# Patient Record
Sex: FEMALE | Race: WHITE | Employment: FULL TIME | URBAN - METROPOLITAN AREA
[De-identification: names, ages, dates, MRNs, and addresses within clinical notes are randomized per-mention and may not be internally consistent; named-entity substitution may affect disease eponyms.]

---

## 2017-01-20 ENCOUNTER — TELEPHONE (OUTPATIENT)
Dept: FAMILY MEDICINE CLINIC | Age: 47
End: 2017-01-20

## 2017-02-10 RX ORDER — LEVOTHYROXINE SODIUM 112 MCG
112 TABLET ORAL DAILY
Qty: 30 TABLET | Refills: 11 | Status: SHIPPED | OUTPATIENT
Start: 2017-02-10

## 2017-03-06 ENCOUNTER — TELEPHONE (OUTPATIENT)
Dept: FAMILY MEDICINE CLINIC | Age: 47
End: 2017-03-06

## 2017-03-20 ENCOUNTER — TELEPHONE (OUTPATIENT)
Dept: FAMILY MEDICINE CLINIC | Age: 47
End: 2017-03-20

## 2017-09-20 ENCOUNTER — HOSPITAL ENCOUNTER (EMERGENCY)
Age: 47
Discharge: HOME | End: 2017-09-20
Payer: COMMERCIAL

## 2017-09-20 VITALS
SYSTOLIC BLOOD PRESSURE: 156 MMHG | HEIGHT: 68 IN | BODY MASS INDEX: 27.28 KG/M2 | HEART RATE: 107 BPM | OXYGEN SATURATION: 99 % | WEIGHT: 180 LBS | TEMPERATURE: 98.4 F | DIASTOLIC BLOOD PRESSURE: 85 MMHG | RESPIRATION RATE: 16 BRPM

## 2017-09-20 DIAGNOSIS — J06.9 ACUTE UPPER RESPIRATORY INFECTION: Primary | ICD-10-CM

## 2017-09-20 PROCEDURE — 99213 OFFICE O/P EST LOW 20 MIN: CPT | Performed by: NURSE PRACTITIONER

## 2017-09-20 PROCEDURE — 99212 OFFICE O/P EST SF 10 MIN: CPT

## 2017-09-20 RX ORDER — PREDNISONE 20 MG/1
20 TABLET ORAL 2 TIMES DAILY
Qty: 10 TABLET | Refills: 0 | Status: SHIPPED | OUTPATIENT
Start: 2017-09-20 | End: 2017-09-25

## 2017-09-20 RX ORDER — AMOXICILLIN AND CLAVULANATE POTASSIUM 875; 125 MG/1; MG/1
1 TABLET, FILM COATED ORAL 2 TIMES DAILY
Qty: 20 TABLET | Refills: 0 | Status: SHIPPED | OUTPATIENT
Start: 2017-09-20 | End: 2017-09-30

## 2017-09-20 RX ORDER — ALBUTEROL SULFATE 90 UG/1
2 AEROSOL, METERED RESPIRATORY (INHALATION) EVERY 4 HOURS PRN
Qty: 1 INHALER | Refills: 0 | Status: SHIPPED | OUTPATIENT
Start: 2017-09-20

## 2017-09-20 RX ORDER — BENZONATATE 100 MG/1
100 CAPSULE ORAL 3 TIMES DAILY PRN
Qty: 21 CAPSULE | Refills: 0 | Status: SHIPPED | OUTPATIENT
Start: 2017-09-20 | End: 2017-09-27

## 2017-09-20 ASSESSMENT — ENCOUNTER SYMPTOMS
TROUBLE SWALLOWING: 0
COUGH: 1
SINUS PRESSURE: 1
RHINORRHEA: 1
NAUSEA: 0
VOMITING: 0
SORE THROAT: 0
CHEST TIGHTNESS: 0
DIARRHEA: 0
ABDOMINAL PAIN: 0
SHORTNESS OF BREATH: 0

## 2017-10-08 DIAGNOSIS — I10 ESSENTIAL HYPERTENSION: ICD-10-CM

## 2017-10-09 RX ORDER — OLMESARTAN MEDOXOMIL AND HYDROCHLOROTHIAZIDE 40/25 40; 25 MG/1; MG/1
TABLET ORAL
Qty: 30 TABLET | Refills: 11 | OUTPATIENT
Start: 2017-10-09

## 2017-11-04 DIAGNOSIS — E78.5 HYPERLIPIDEMIA LDL GOAL <70: ICD-10-CM

## 2017-11-06 RX ORDER — ATORVASTATIN CALCIUM 20 MG/1
TABLET, FILM COATED ORAL
Qty: 30 TABLET | Refills: 11 | Status: SHIPPED | OUTPATIENT
Start: 2017-11-06

## 2018-01-17 ENCOUNTER — PATIENT MESSAGE (OUTPATIENT)
Dept: FAMILY MEDICINE CLINIC | Age: 48
End: 2018-01-17

## 2018-01-17 DIAGNOSIS — I10 ESSENTIAL HYPERTENSION: ICD-10-CM

## 2018-01-17 RX ORDER — OLMESARTAN MEDOXOMIL AND HYDROCHLOROTHIAZIDE 40/25 40; 25 MG/1; MG/1
TABLET ORAL
Qty: 30 TABLET | Refills: 0 | Status: SHIPPED | OUTPATIENT
Start: 2018-01-17 | End: 2018-02-21 | Stop reason: SDUPTHER

## 2018-01-17 NOTE — TELEPHONE ENCOUNTER
From: Sylvie Moncada Masters  Sent: 1/16/2018 11:30 PM EST  Subject: Medication Renewal Request    Sylvie Moncada. Masters would like a refill of the following medications:  dapagliflozin (FARXIGA) 10 MG tablet Gita Carr, DO]    Preferred pharmacy: [id number] - [address] [place] [address] [phone] - F [phone]    Comment:  Is there a way u can approve me for jardiancw until I come to my appointment, as I am out of town and was going to have my family member  my prescription and send to me.  Let me know, thanks

## 2018-02-05 ENCOUNTER — TELEPHONE (OUTPATIENT)
Dept: FAMILY MEDICINE CLINIC | Age: 48
End: 2018-02-05

## 2018-02-21 DIAGNOSIS — I10 ESSENTIAL HYPERTENSION: ICD-10-CM

## 2018-02-21 RX ORDER — OLMESARTAN MEDOXOMIL AND HYDROCHLOROTHIAZIDE 40/25 40; 25 MG/1; MG/1
TABLET ORAL
Qty: 30 TABLET | Refills: 0 | Status: SHIPPED | OUTPATIENT
Start: 2018-02-21

## 2020-09-04 ENCOUNTER — HOSPITAL ENCOUNTER (EMERGENCY)
Age: 50
Discharge: HOME | End: 2020-09-04
Payer: COMMERCIAL

## 2020-09-04 VITALS
WEIGHT: 185 LBS | OXYGEN SATURATION: 98 % | RESPIRATION RATE: 16 BRPM | DIASTOLIC BLOOD PRESSURE: 77 MMHG | TEMPERATURE: 97.6 F | SYSTOLIC BLOOD PRESSURE: 129 MMHG | HEIGHT: 67 IN | BODY MASS INDEX: 29.03 KG/M2 | HEART RATE: 84 BPM

## 2020-09-04 PROCEDURE — 99212 OFFICE O/P EST SF 10 MIN: CPT

## 2020-09-04 PROCEDURE — 99213 OFFICE O/P EST LOW 20 MIN: CPT | Performed by: NURSE PRACTITIONER

## 2020-09-04 RX ORDER — PREDNISONE 20 MG/1
20 TABLET ORAL 2 TIMES DAILY
Qty: 10 TABLET | Refills: 0 | Status: SHIPPED | OUTPATIENT
Start: 2020-09-04 | End: 2020-09-09

## 2020-09-04 RX ORDER — LORATADINE 10 MG/1
10 TABLET ORAL DAILY
Qty: 30 TABLET | Refills: 0 | Status: SHIPPED | OUTPATIENT
Start: 2020-09-04 | End: 2020-10-04

## 2020-09-04 ASSESSMENT — ENCOUNTER SYMPTOMS
COUGH: 0
SHORTNESS OF BREATH: 0
FACIAL SWELLING: 0
TROUBLE SWALLOWING: 0

## 2020-09-04 NOTE — ED PROVIDER NOTES
[facility]  Urgent Care Encounter       CHIEF COMPLAINT       Chief Complaint   Patient presents with    Rash       Nurses Notes reviewed and I agree except as noted in the HPI. HISTORY OF PRESENT ILLNESS   Deanne Zavala is a 52 y.o. female who presents with complaints of rash on left forearm that started on Wednesday while she was out of town visiting a friend. She states she first noticed it at work in which he began itching. She states her rash is raised and reddened. She denies any fever, chills, or shortness of breath. She states it has worsened since that time. She has been using hydrocortisone 1% topical cream without improvement in symptoms. REVIEW OF SYSTEMS     Review of Systems   Constitutional: Negative for chills and fever. HENT: Negative for facial swelling and trouble swallowing. Respiratory: Negative for cough and shortness of breath. Cardiovascular: Negative for chest pain. Skin: Positive for rash (hives left forearm). Neurological: Negative for weakness. PAST MEDICAL HISTORY         Diagnosis Date    Diabetes mellitus ([facility] [address]     Hypertension     Thyroid disease        SURGICALHISTORY     Patient  has a past surgical history that includes Dilation and curettage of uterus and Endometrial ablation. CURRENT MEDICATIONS       Discharge Medication List as of 9/4/2020  2:02 PM      CONTINUE these medications which have NOT CHANGED    Details   !! olmesartan-hydrochlorothiazide (BENICAR HCT) 40-25 MG per tablet TAKE ONE TABLET BY MOUTH EVERY DAY, Disp-30 tablet, R-0Normal      atorvastatin (LIPITOR) 20 MG tablet TAKE ONE TABLET BY MOUTH NIGHTLY, Disp-30 tablet, R-11Normal      !!  Olmesartan Medoxomil-HCTZ (BENICAR HCT PO) Take by mouthHistorical Med      albuterol sulfate  (90 Base) MCG/ACT inhaler Inhale 2 puffs into the lungs every 4 hours as needed for Wheezing or Shortness of Breath, Disp-1 Inhaler, R-0Normal      SYNTHROID 112 MCG tablet Take 1 tablet by mouth daily Take with water on an empty stomach- wait 30 minutes before eating or taking other meds. , Disp-30 tablet, R-11, DAWNormal      valsartan-hydrochlorothiazide (DIOVAN HCT) 320-25 MG per tablet Take 1 tablet by mouth daily, Disp-30 tablet, R-11      aspirin 81 MG EC tablet Take 1 tablet by mouth daily. , Disp-30 tablet, R-11      ONE TOUCH ULTRA TEST strip Check sugars daily. Dx: 250.02, Disp-100 strip, R-3, TANIA       !! - Potential duplicate medications found. Please discuss with provider. ALLERGIES     Patient is is allergic to latex and sulfa antibiotics. Patients   There is no immunization history on file for this patient. FAMILY HISTORY     Patient's family history includes Diabetes in her father; Heart Disease in her mother. SOCIAL HISTORY     Patient  reports that she has never smoked. She has never used smokeless tobacco. She reports current alcohol use. She reports that she does not use drugs. PHYSICAL EXAM     ED TRIAGE VITALS  BP: 129/77, Temp: 97.6 °F (36.4 °C), Pulse: 84, Resp: 16, SpO2: 98 %,Estimated body mass index is 28.98 kg/m² as calculated from the following:    Height as of this encounter: 5' 7\" (1.702 m). Weight as of this encounter: 185 lb (83.9 kg). ,No LMP recorded. Patient has had an ablation. Physical Exam  Vitals signs and nursing note reviewed. Constitutional:       General: She is not in acute distress. Appearance: Normal appearance. HENT:      Mouth/Throat:      Mouth: Mucous membranes are moist. No angioedema. Pharynx: No posterior oropharyngeal erythema. Cardiovascular:      Rate and Rhythm: Normal rate and regular rhythm. Heart sounds: Normal heart sounds. Pulmonary:      Effort: Pulmonary effort is normal.      Breath sounds: Normal breath sounds. Musculoskeletal:        Arms:    Skin:     General: Skin is warm and dry. Capillary Refill: Capillary refill takes less than 2 seconds.       Findings: Erythema and rash present. Rash is urticarial.   Neurological:      Mental Status: She is alert and oriented to person, place, and time. Psychiatric:         Mood and Affect: Mood normal.         Behavior: Behavior normal.         Thought Content: Thought content normal.         Judgment: Judgment normal.         DIAGNOSTIC RESULTS     Labs:No results found for this visit on 09/04/20. IMAGING:  None    EKG:  None    URGENT CARE COURSE:     Vitals:    09/04/20 1341   BP: 129/77   Pulse: 84   Resp: 16   Temp: 97.6 °F (36.4 °C)   TempSrc: Temporal   SpO2: 98%   Weight: 185 lb (83.9 kg)   Height: 5' 7\" (1.702 m)       Medications - No data to display         PROCEDURES:  None    FINAL IMPRESSION      1. Rash and other nonspecific skin eruption    2. Urticaria      DISPOSITION/ PLAN   DISPOSITION Decision To Discharge 09/04/2020 01:58:40 PM     Urticaria to the left forearm likely from contact dermatitis of unknown source. We will start her on hydrocortisone 2.5 percent topical, loratadine, and prednisone twice daily. She was instructed she may take Pepto-Bismol every 6 hours as needed as well. She should follow-up with her PCP in 3 days if symptoms worsen or do not improve. She voiced understanding. All questions were answered. Patient was discharged in stable condition. PATIENT REFERRED TO:  Darian Neves DO  [address] [place] [id number]      DISCHARGE MEDICATIONS:  Discharge Medication List as of 9/4/2020  2:02 PM      START taking these medications    Details   loratadine (CLARITIN) 10 MG tablet Take 1 tablet by mouth daily, Disp-30 tablet,R-0Print      predniSONE (DELTASONE) 20 MG tablet Take 1 tablet by mouth 2 times daily for 5 days, Disp-10 tablet,R-0Print      bismuth subsalicylate (BISMATROL) 262 MG/15ML suspension Take 15 mLs by mouth every 6 hours as needed for Other (rash), R-0OTC      hydrocortisone 2.5 % cream Apply topically 2 times daily. , Disp-1 Tube,R-0, Print Discharge Medication List as of 9/4/2020  2:02 PM      STOP taking these medications       UNKNOWN TO PATIENT Comments:   Reason for Stopping:               Discharge Medication List as of 9/4/2020  2:02 PM          ALIVIA Jaeger CNP    (Please note that portions of this note were completed with a voice recognition program. Efforts were made to edit the dictations but occasionally words are mis-transcribed.)            ALIVIA Jaeger CNP  09/04/20 4658

## 2020-12-18 ENCOUNTER — HOSPITAL ENCOUNTER (EMERGENCY)
Age: 50
Discharge: HOME | End: 2020-12-18
Attending: NURSE PRACTITIONER
Payer: COMMERCIAL

## 2020-12-18 VITALS
BODY MASS INDEX: 27.13 KG/M2 | SYSTOLIC BLOOD PRESSURE: 132 MMHG | WEIGHT: 179 LBS | HEIGHT: 68 IN | HEART RATE: 97 BPM | RESPIRATION RATE: 16 BRPM | TEMPERATURE: 97.9 F | DIASTOLIC BLOOD PRESSURE: 80 MMHG | OXYGEN SATURATION: 100 %

## 2020-12-18 PROCEDURE — 99212 OFFICE O/P EST SF 10 MIN: CPT

## 2020-12-18 PROCEDURE — 99213 OFFICE O/P EST LOW 20 MIN: CPT | Performed by: NURSE PRACTITIONER

## 2020-12-18 RX ORDER — CEPHALEXIN 500 MG/1
500 CAPSULE ORAL 4 TIMES DAILY
Qty: 28 CAPSULE | Refills: 0 | Status: SHIPPED | OUTPATIENT
Start: 2020-12-18 | End: 2020-12-25

## 2020-12-18 RX ORDER — DOXYCYCLINE HYCLATE 100 MG
100 TABLET ORAL 2 TIMES DAILY
Qty: 20 TABLET | Refills: 0 | Status: SHIPPED | OUTPATIENT
Start: 2020-12-18 | End: 2020-12-28

## 2020-12-18 ASSESSMENT — PAIN DESCRIPTION - LOCATION: LOCATION: TOE (COMMENT WHICH ONE)

## 2020-12-18 ASSESSMENT — PAIN DESCRIPTION - ORIENTATION: ORIENTATION: RIGHT

## 2020-12-18 ASSESSMENT — ENCOUNTER SYMPTOMS
NAUSEA: 0
RHINORRHEA: 0
SHORTNESS OF BREATH: 0
SORE THROAT: 0
VOMITING: 0
CHEST TIGHTNESS: 0
DIARRHEA: 0
COUGH: 0

## 2020-12-18 ASSESSMENT — PAIN DESCRIPTION - FREQUENCY: FREQUENCY: CONTINUOUS

## 2020-12-18 ASSESSMENT — PAIN SCALES - GENERAL: PAINLEVEL_OUTOF10: 3

## 2020-12-18 ASSESSMENT — PAIN DESCRIPTION - DESCRIPTORS: DESCRIPTORS: SORE

## 2020-12-18 NOTE — ED PROVIDER NOTES
[facility]  Urgent Care Encounter       CHIEF COMPLAINT       Chief Complaint   Patient presents with    Toe Pain     stubbed right great toe yesterday today pus came out of the toe near the nail. right great toe is red with drainage near nail       Nurses Notes reviewed and I agree except as noted in the HPI. HISTORY OF PRESENT ILLNESS   David Urena is a 48 y.o. female who presents to the [facility] urgent care for evaluation of  right great toe injury. Patient reports last night she was ambulating in her home, when she stubbed her toe. She reports this morning waking up to a blister to the lateral nail be she does have full range of motion d. She reports this blister opened up and contained purulent drainage. She does report being a diabetic. She denies diabetic neuropathy or current numbness. She does have full range of motion. She does have noted ecchymosis with a small ulcer lateral nail bed. The history is provided by the patient. No  was used. REVIEW OF SYSTEMS     Review of Systems   Constitutional: Negative for activity change, appetite change, chills, fatigue and fever. HENT: Negative for ear discharge, ear pain, rhinorrhea and sore throat. Respiratory: Negative for cough, chest tightness and shortness of breath. Cardiovascular: Negative for chest pain. Gastrointestinal: Negative for diarrhea, nausea and vomiting. Genitourinary: Negative for dysuria. Musculoskeletal: Positive for arthralgias (great great toe pain). Skin: Negative for rash. Allergic/Immunologic: Negative for environmental allergies and food allergies. Neurological: Negative for dizziness and headaches.        PAST MEDICAL HISTORY         Diagnosis Date    Diabetes mellitus ([address]     Hypertension     Thyroid disease        SURGICALHISTORY     Patient  has a past surgical history that includes Dilation and curettage of uterus and Endometrial ablation. CURRENT MEDICATIONS       Discharge Medication List as of 12/18/2020  5:43 PM      CONTINUE these medications which have NOT CHANGED    Details   bismuth subsalicylate (BISMATROL) 262 MG/15ML suspension Take 15 mLs by mouth every 6 hours as needed for Other (rash), R-0OTC      hydrocortisone 2.5 % cream Apply topically 2 times daily. , Disp-1 Tube,R-0, Print      !! olmesartan-hydrochlorothiazide (BENICAR HCT) 40-25 MG per tablet TAKE ONE TABLET BY MOUTH EVERY DAY, Disp-30 tablet, R-0Normal      atorvastatin (LIPITOR) 20 MG tablet TAKE ONE TABLET BY MOUTH NIGHTLY, Disp-30 tablet, R-11Normal      !! Olmesartan Medoxomil-HCTZ (BENICAR HCT PO) Take by mouthHistorical Med      albuterol sulfate  (90 Base) MCG/ACT inhaler Inhale 2 puffs into the lungs every 4 hours as needed for Wheezing or Shortness of Breath, Disp-1 Inhaler, R-0Normal      SYNTHROID 112 MCG tablet Take 1 tablet by mouth daily Take with water on an empty stomach- wait 30 minutes before eating or taking other meds. , Disp-30 tablet, R-11, DAWNormal      valsartan-hydrochlorothiazide (DIOVAN HCT) 320-25 MG per tablet Take 1 tablet by mouth daily, Disp-30 tablet, R-11      aspirin 81 MG EC tablet Take 1 tablet by mouth daily. , Disp-30 tablet, R-11      ONE TOUCH ULTRA TEST strip Check sugars daily. Dx: 250.02, Disp-100 strip, R-3, TANIA       !! - Potential duplicate medications found. Please discuss with provider. ALLERGIES     Patient is is allergic to latex and sulfa antibiotics. Patients   There is no immunization history on file for this patient. FAMILY HISTORY     Patient's family history includes Diabetes in her father; Heart Disease in her mother. SOCIAL HISTORY     Patient  reports that she has never smoked. She has never used smokeless tobacco. She reports current alcohol use. She reports that she does not use drugs.     PHYSICAL EXAM     ED TRIAGE VITALS  BP: 132/80, Temp: 97.9 °F (36.6 °C), Pulse: 97, Resp: 16, SpO2: 100 %,Estimated body mass index is 27.62 kg/m² as calculated from the following:    Height as of this encounter: 5' 7.5\" (1.715 m). Weight as of this encounter: 179 lb (81.2 kg). ,No LMP recorded. Patient has had an ablation. Physical Exam  Vitals signs and nursing note reviewed. Constitutional:       General: She is not in acute distress. Appearance: Normal appearance. She is not ill-appearing, toxic-appearing or diaphoretic. HENT:      Head: Normocephalic. Right Ear: Ear canal and external ear normal.      Left Ear: Ear canal and external ear normal.      Nose: Nose normal. No congestion or rhinorrhea. Mouth/Throat:      Mouth: Mucous membranes are moist.      Pharynx: Oropharynx is clear. No oropharyngeal exudate or posterior oropharyngeal erythema. Neck:      Musculoskeletal: Normal range of motion. Cardiovascular:      Rate and Rhythm: Normal rate. Pulses: Normal pulses. Pulmonary:      Effort: Pulmonary effort is normal. No respiratory distress. Breath sounds: No stridor. No wheezing or rhonchi. Abdominal:      General: Abdomen is flat. Bowel sounds are normal.      Palpations: Abdomen is soft. Musculoskeletal: Normal range of motion. General: No swelling or tenderness. Feet:    Feet:      Right foot:      Skin integrity: Ulcer (Small open area lateral nail bed. Reported purulent drainage) and erythema (right great toe.) present. Neurological:      General: No focal deficit present. Mental Status: She is alert and oriented to person, place, and time. Psychiatric:         Mood and Affect: Mood normal.         Behavior: Behavior normal.         DIAGNOSTIC RESULTS     Labs:No results found for this visit on 12/18/20.     IMAGING:    No orders to display         EKG: NONE      URGENT CARE COURSE:     Vitals:    12/18/20 1730   BP: 132/80   Pulse: 97   Resp: 16   Temp: 97.9 °F (36.6 °C)   SpO2: 100%   Weight: 179 lb (81.2 kg)   Height: 5' 7.5\" (1.715 m)       Medications - No data to display         PROCEDURES:  None    FINAL IMPRESSION      1. Diabetic foot infection [facility])          DISPOSITION/ PLAN     Patient was seen and evaluated at was at urgent care for right foot injury. She was provided with a prescription for Keflex and doxycycline. We did discuss that being diabetic decreases her wound healing. She is instructed to follow-up with her primary care provider in 3 to 5 days for a wound recheck. She is also instructed to rest.  Patient is agreeable to the above plan and denies questions at this time.       PATIENT REFERRED TO:  Medardo Bryant MD  [address] [place] [address] / [facility] [id number]      DISCHARGE MEDICATIONS:  Discharge Medication List as of 12/18/2020  5:43 PM      START taking these medications    Details   cephALEXin (KEFLEX) 500 MG capsule Take 1 capsule by mouth 4 times daily for 7 days, Disp-28 capsule, R-0Normal      doxycycline hyclate (VIBRA-TABS) 100 MG tablet Take 1 tablet by mouth 2 times daily for 10 days, Disp-20 tablet, R-0Normal             Discharge Medication List as of 12/18/2020  5:43 PM          Discharge Medication List as of 12/18/2020  5:43 PM          ALIVIA Man CNP    (Please note that portions of this note were completed with a voice recognition program. Efforts were made to edit the dictations but occasionally words are mis-transcribed.)         ALIVIA Man CNP  12/18/20 1289

## 2022-12-09 ENCOUNTER — HOSPITAL ENCOUNTER (OUTPATIENT)
Dept: WOMENS IMAGING | Age: 52
Discharge: HOME | End: 2022-12-09
Payer: COMMERCIAL

## 2022-12-09 DIAGNOSIS — Z12.31 VISIT FOR SCREENING MAMMOGRAM: ICD-10-CM

## 2022-12-09 PROCEDURE — 77063 BREAST TOMOSYNTHESIS BI: CPT

## 2022-12-16 ENCOUNTER — HOSPITAL ENCOUNTER (OUTPATIENT)
Dept: WOMENS IMAGING | Age: 52
Discharge: HOME | End: 2022-12-16
Payer: COMMERCIAL

## 2022-12-16 DIAGNOSIS — R92.1 BREAST CALCIFICATIONS: ICD-10-CM

## 2022-12-16 PROCEDURE — 77066 DX MAMMO INCL CAD BI: CPT

## 2023-01-04 ENCOUNTER — OFFICE VISIT (OUTPATIENT)
Dept: FAMILY MEDICINE CLINIC | Age: 53
End: 2023-01-04
Payer: COMMERCIAL

## 2023-01-04 VITALS
HEART RATE: 85 BPM | BODY MASS INDEX: 33.87 KG/M2 | SYSTOLIC BLOOD PRESSURE: 139 MMHG | OXYGEN SATURATION: 97 % | WEIGHT: 215.8 LBS | DIASTOLIC BLOOD PRESSURE: 75 MMHG | HEIGHT: 67 IN | TEMPERATURE: 96.6 F

## 2023-01-04 DIAGNOSIS — B96.89 ACUTE BACTERIAL RHINOSINUSITIS: Primary | ICD-10-CM

## 2023-01-04 DIAGNOSIS — R05.1 ACUTE COUGH: ICD-10-CM

## 2023-01-04 DIAGNOSIS — J01.90 ACUTE BACTERIAL RHINOSINUSITIS: Primary | ICD-10-CM

## 2023-01-04 PROCEDURE — G8484 FLU IMMUNIZE NO ADMIN: HCPCS | Performed by: STUDENT IN AN ORGANIZED HEALTH CARE EDUCATION/TRAINING PROGRAM

## 2023-01-04 PROCEDURE — 1036F TOBACCO NON-USER: CPT | Performed by: STUDENT IN AN ORGANIZED HEALTH CARE EDUCATION/TRAINING PROGRAM

## 2023-01-04 PROCEDURE — 99213 OFFICE O/P EST LOW 20 MIN: CPT | Performed by: STUDENT IN AN ORGANIZED HEALTH CARE EDUCATION/TRAINING PROGRAM

## 2023-01-04 PROCEDURE — 3017F COLORECTAL CA SCREEN DOC REV: CPT | Performed by: STUDENT IN AN ORGANIZED HEALTH CARE EDUCATION/TRAINING PROGRAM

## 2023-01-04 PROCEDURE — G8427 DOCREV CUR MEDS BY ELIG CLIN: HCPCS | Performed by: STUDENT IN AN ORGANIZED HEALTH CARE EDUCATION/TRAINING PROGRAM

## 2023-01-04 PROCEDURE — 3078F DIAST BP <80 MM HG: CPT | Performed by: STUDENT IN AN ORGANIZED HEALTH CARE EDUCATION/TRAINING PROGRAM

## 2023-01-04 PROCEDURE — 3075F SYST BP GE 130 - 139MM HG: CPT | Performed by: STUDENT IN AN ORGANIZED HEALTH CARE EDUCATION/TRAINING PROGRAM

## 2023-01-04 PROCEDURE — G8417 CALC BMI ABV UP PARAM F/U: HCPCS | Performed by: STUDENT IN AN ORGANIZED HEALTH CARE EDUCATION/TRAINING PROGRAM

## 2023-01-04 RX ORDER — GLIMEPIRIDE 2 MG/1
TABLET ORAL
COMMUNITY
Start: 2022-12-08

## 2023-01-04 RX ORDER — AMOXICILLIN AND CLAVULANATE POTASSIUM 875; 125 MG/1; MG/1
1 TABLET, FILM COATED ORAL 2 TIMES DAILY
Qty: 14 TABLET | Refills: 0 | Status: SHIPPED | OUTPATIENT
Start: 2023-01-04 | End: 2023-01-11

## 2023-01-04 RX ORDER — LOSARTAN POTASSIUM 100 MG/1
TABLET ORAL
COMMUNITY
Start: 2022-12-08

## 2023-01-04 ASSESSMENT — PATIENT HEALTH QUESTIONNAIRE - PHQ9
SUM OF ALL RESPONSES TO PHQ QUESTIONS 1-9: 0
SUM OF ALL RESPONSES TO PHQ9 QUESTIONS 1 & 2: 0
2. FEELING DOWN, DEPRESSED OR HOPELESS: 0
SUM OF ALL RESPONSES TO PHQ QUESTIONS 1-9: 0
1. LITTLE INTEREST OR PLEASURE IN DOING THINGS: 0

## 2023-01-04 ASSESSMENT — ENCOUNTER SYMPTOMS
SHORTNESS OF BREATH: 0
COUGH: 1
NAUSEA: 0
DIARRHEA: 0
ABDOMINAL PAIN: 0
SORE THROAT: 1
VOMITING: 0
RHINORRHEA: 1
SINUS PRESSURE: 1

## 2023-01-04 NOTE — PROGRESS NOTES
[address] [address]  Dept: [phone]  Dept Fax: [phone]  Loc: [phone]    Toñito Perez is a 46 y.o. female who presents today for her medical conditions/complaints as noted below. Chief Complaint   Patient presents with    Congestion     Headache itchy ears cough congestions fever x 4 days       HPI:     Patient presents to the office today for concerns of URI symptoms. Patient states she has had itchy ears with a muffled sensation, cough, nasal congestion, headache and pressure in her forehead, fever up to 101.4 degrees last night, and sore throat. Yesterday, she was coughing up some yellow phlegm from postnasal drainage. Has had a decreased appetite and is drinking a little less than normal but is able to keep fluids down. Denies associated shortness of breath, chest pain, or GI symptoms. Symptoms started about 4-5 days ago. No one else is sick at home currently. She has been taking Mucinex DM with just temporary relief. She is a non-smoker, no history of asthma or COPD. Past Medical History:   Diagnosis Date    Diabetes mellitus ([address]     Hypertension     Thyroid disease       Past Surgical History:   Procedure Laterality Date    DILATION AND CURETTAGE OF UTERUS      ENDOMETRIAL ABLATION         Family History   Problem Relation Age of Onset    Heart Disease Mother     Diabetes Father        Social History     Tobacco Use    Smoking status: Never    Smokeless tobacco: Never   Substance Use Topics    Alcohol use: Yes     Comment: occas.       Current Outpatient Medications   Medication Sig Dispense Refill    amoxicillin-clavulanate (AUGMENTIN) 875-125 MG per tablet Take 1 tablet by mouth 2 times daily for 7 days 14 tablet 0    glimepiride (AMARYL) 2 MG tablet TAKE 1 TABLET BY MOUTH EVERY DAY AT BREAKFAST AND TAKE 1/2 TABLET AT LUNCH      losartan (COZAAR) 100 MG tablet TAKE 1 TABLET BY MOUTH EVERY DAY      atorvastatin (LIPITOR) 20 MG tablet TAKE ONE TABLET BY MOUTH NIGHTLY 30 tablet 11    SYNTHROID 112 MCG tablet Take 1 tablet by mouth daily Take with water on an empty stomach- wait 30 minutes before eating or taking other meds. 30 tablet 11    aspirin 81 MG EC tablet Take 1 tablet by mouth daily. 30 tablet 11     No current facility-administered medications for this visit. Allergies   Allergen Reactions    Latex Rash    Sulfa Antibiotics Swelling       Health Maintenance   Topic Date Due    Pneumococcal 0-64 years Vaccine (1 - PCV) Never done    Depression Screen  Never done    HIV screen  Never done    Diabetic Alb to Cr ratio (uACR) test  Never done    Diabetic retinal exam  Never done    Hepatitis C screen  Never done    Hepatitis B vaccine (1 of 3 - Risk 3-dose series) Never done    DTaP/Tdap/Td vaccine (1 - Tdap) Never done    Cervical cancer screen  Never done    Colorectal Cancer Screen  Never done    Diabetic foot exam  04/17/2016    GFR test (Diabetes, CKD 3-4, OR last GFR 15-59)  04/22/2016    Lipids  06/22/2017    A1C test (Diabetic or Prediabetic)  12/13/2017    Shingles vaccine (1 of 2) Never done    COVID-19 Vaccine (3 - Booster for Pfizer series) 03/26/2022    Flu vaccine (1) Never done    Breast cancer screen  12/16/2024    Hepatitis A vaccine  Aged Out    Hib vaccine  Aged Out    Meningococcal (ACWY) vaccine  Aged Out       Subjective:      Review of Systems   Constitutional:  Positive for appetite change, fatigue and fever. Negative for chills. HENT:  Positive for congestion, rhinorrhea, sinus pressure and sore throat. Negative for ear discharge and ear pain.         + muffled sensation in ears  + itchy ears   Respiratory:  Positive for cough. Negative for shortness of breath. Cardiovascular:  Negative for chest pain. Gastrointestinal:  Negative for abdominal pain, diarrhea, nausea and vomiting. Neurological:  Positive for headaches.      Objective:     Physical Exam  Vitals and nursing note reviewed. Constitutional:       General: She is not in acute distress. Appearance: Normal appearance. She is well-developed. She is not toxic-appearing. HENT:      Right Ear: Tympanic membrane, ear canal and external ear normal.      Left Ear: Tympanic membrane, ear canal and external ear normal.      Nose: Congestion present. Right Sinus: Frontal sinus tenderness present. No maxillary sinus tenderness. Left Sinus: Frontal sinus tenderness present. No maxillary sinus tenderness. Mouth/Throat:      Lips: Pink. Mouth: Mucous membranes are moist.      Pharynx: Oropharynx is clear. Uvula midline. No oropharyngeal exudate or posterior oropharyngeal erythema. Eyes:      General: Lids are normal.      Conjunctiva/sclera: Conjunctivae normal.   Cardiovascular:      Rate and Rhythm: Normal rate and regular rhythm. Heart sounds: Normal heart sounds. No murmur heard. Pulmonary:      Effort: Pulmonary effort is normal.      Breath sounds: Normal breath sounds and air entry. No wheezing, rhonchi or rales. Skin:     General: Skin is warm and dry. Neurological:      General: No focal deficit present. Mental Status: She is alert and oriented to person, place, and time. Psychiatric:         Mood and Affect: Mood normal.         Behavior: Behavior is cooperative. /75 (Site: Right Upper Arm, Position: Sitting, Cuff Size: Large Adult)   Pulse 85   Temp (!) 96.6 °F (35.9 °C)   Ht 5' 7\" (1.702 m)   Wt 215 lb 12.8 oz (97.9 kg)   SpO2 97%   BMI 33.80 kg/m²     Assessment/Plan:   Jun Alonzo was seen today for congestion. Diagnoses and all orders for this visit:    Acute bacterial rhinosinusitis  -     amoxicillin-clavulanate (AUGMENTIN) 875-125 MG per tablet; Take 1 tablet by mouth 2 times daily for 7 days    Acute cough    80-year-old female presenting to the office with congestion, sinus pressure, and cough x4-5 days.   Patient is afebrile and nontoxic-appearing in the office today. Symptoms are most consistent with sinusitis with postnasal drainage causing a cough. I will plan to treat with Augmentin x7 days. She was advised to continue supportive care with rest, hydration, Tylenol or Motrin as needed for fever or discomfort, and over-the-counter cough and cold medicine as needed for symptomatic management. Advised to monitor symptoms closely and return if worsening or persisting. Return if symptoms worsen or fail to improve.     Electronically signed by Apoorva Eckert DO on 1/4/2023 at 10:18 AM

## 2023-07-07 ENCOUNTER — HOSPITAL ENCOUNTER (OUTPATIENT)
Dept: WOMENS IMAGING | Age: 53
Discharge: HOME | End: 2023-07-07
Attending: RADIOLOGY
Payer: COMMERCIAL

## 2023-07-07 DIAGNOSIS — R92.1 BREAST CALCIFICATIONS: ICD-10-CM

## 2023-07-07 DIAGNOSIS — Z09 FOLLOW-UP EXAM: ICD-10-CM

## 2023-07-07 PROCEDURE — G0279 TOMOSYNTHESIS, MAMMO: HCPCS

## 2024-10-16 ENCOUNTER — HOSPITAL ENCOUNTER (OUTPATIENT)
Dept: WOMENS IMAGING | Age: 54
Discharge: HOME | End: 2024-10-16
Payer: COMMERCIAL

## 2024-10-16 VITALS — HEIGHT: 67 IN | WEIGHT: 189 LBS | BODY MASS INDEX: 29.66 KG/M2

## 2024-10-16 DIAGNOSIS — Z12.31 VISIT FOR SCREENING MAMMOGRAM: ICD-10-CM

## 2024-10-16 PROCEDURE — 77063 BREAST TOMOSYNTHESIS BI: CPT
